# Patient Record
Sex: MALE | Race: WHITE
[De-identification: names, ages, dates, MRNs, and addresses within clinical notes are randomized per-mention and may not be internally consistent; named-entity substitution may affect disease eponyms.]

---

## 2020-08-10 ENCOUNTER — HOSPITAL ENCOUNTER (EMERGENCY)
Dept: HOSPITAL 56 - MW.ED | Age: 60
Discharge: HOME | End: 2020-08-10
Payer: COMMERCIAL

## 2020-08-10 DIAGNOSIS — E11.9: ICD-10-CM

## 2020-08-10 DIAGNOSIS — R03.0: ICD-10-CM

## 2020-08-10 DIAGNOSIS — F17.200: ICD-10-CM

## 2020-08-10 DIAGNOSIS — R07.89: Primary | ICD-10-CM

## 2020-08-10 LAB
BUN SERPL-MCNC: 14 MG/DL (ref 7–18)
CHLORIDE SERPL-SCNC: 98 MMOL/L (ref 98–107)
CO2 SERPL-SCNC: 25.2 MMOL/L (ref 21–32)
GLUCOSE SERPL-MCNC: 360 MG/DL (ref 74–106)
HBA1C BLD-MCNC: 10.1 % (ref 4.5–6.2)
POTASSIUM SERPL-SCNC: 3.9 MMOL/L (ref 3.5–5.1)
SODIUM SERPL-SCNC: 133 MMOL/L (ref 136–148)

## 2020-08-10 NOTE — CR
Chest: 2 views of the chest were obtained.

 

Comparison: No previous chest imaging.

 

Heart size and mediastinum are normal.  Lungs are clear with no acute 

parenchymal change.  No acute osseous finding is appreciated.

 

Impression:

1.  Nothing acute is seen on 2 view chest x-ray.

 

Diagnostic code #1

 

Study was dictated in MDT

## 2020-08-10 NOTE — EDM.PDOC
ED [facility] GENERAL MEDICAL PROBLEM





- General


Chief Complaint: Chest Pain


Stated Complaint: CHEST PAINS


Time Seen by Provider: 08/10/20 13:59


Source of Information: Reports: Patient


History Limitations: Reports: No Limitations





- History of Present Illness


INITIAL COMMENTS - FREE TEXT/NARRATIVE: 


60-year-old male with no past medical history presenting with chest pain.  

Patient reports intermittent chest pain, onset around 9 PM last night while at 

rest.  The chest pain migrates to different portions of the chest.  This episode

started around 9:00 this morning, located on the right side of the chest, 

nonradiating.  He describes it as "soreness".  Pain has been waxing and waning 

throughout the day but has not subsided.  Currently rated as 2 out of 10, 

patient states that it is improving on its own.  No self treatment prior to 

arrival.





Denies any diaphoresis, nausea, vomiting, history of VTE, lower extremity 

swelling or pain, hemoptysis, recent surgery or immobilization or long travel, 

history of malignancy.  No recent fever, cough, or chest wall trauma.  There is 

a family history of premature coronary artery disease.





ROS: A 10-point review of systems was negative, except as noted in the HPI (or 

in the ROS section of this note).





Past medical history: Reviewed, no additional pertinent history.


Surgical history: Reviewed in system, no additional pertinent history.


Social history: Reviewed in system, no additional pertinent history.


Family history: Reviewed in system, no additional pertinent history.


________________________________________________________________________________





PHYSICAL EXAM


Vital signs reviewed.  Nursing notes reviewed.


Constitutional: Awake, alert, non-distressed.


Head: Normocephalic, atraumatic.


Eyes: EOMI, conjunctiva normal, no discharge, no scleral icterus.


Ears, Nose, Throat: External ears and nose normal, moist oral mucosa.


Cardiovascular: 2+ radial pulses bilaterally, capillary refill less than 2 

seconds.  RRR no MRG


Pulmonary: normal work of breathing, no accessory muscle use.  CTA BL


Abdomen/GI: Soft, nontender, nondistended, no guarding or rigidity, no masses.


Musculoskeletal: No deformities.


Integumentary: Appropriate color for ethnicity, warm, dry, no pallor or 

jaundice, no rash.


Neurologic: Alert, answering questions appropriately, normal speech, no facial 

droop, moving all extremities well.


Psychiatric: Appropriate mood and affect, normal thought process.





  ** Chest


Pain Score (Numeric/FACES): 4





- Related Data


                                    Allergies











Allergy/AdvReac Type Severity Reaction Status Date / Time


 


No Known Allergies Allergy   Verified 08/10/20 14:06











Home Meds: 


                                    Home Meds





amLODIPine [Norvasc] 2.5 mg PO DAILY #30 tablet 08/10/20 [Rx]


metFORMIN [Glucophage XR] 500 mg PO BIDMEALS 30 Days #60 tab.er 08/10/20 [Rx]











Past Medical History





- Past Health History


Medical/Surgical History: Denies Medical/Surgical History





Social & Family History





- Family History


Cardiac: Reports: CAD





- Tobacco Use


Smoking Status *Q: Current Some Day Smoker





- Alcohol Use


Alcohol Use History: Yes





- Recreational Drug Use


Recreational Drug Use: No





ED ROS GENERAL





- Review of Systems


Review Of Systems: See Below





ED EXAM, GENERAL





- Physical Exam


Exam: See Below





EKG INTERPRETATION


EKG Interpretation Comments: 


12-Lead ECG Interpretation 


Acquired: 2:01 PM


Rhythm: Sinus rhythm


Rate: 77 bpm


Axis: Normal


Intervals: Normal


Ectopy: None


Ischemic Changes: None apparent


RV Strain: No obvious RV strain pattern.


ST Segments/T-Waves: Biphasic T waves in aVL


Interpretation: Unremarkable





Course





- Vital Signs


Text/Narrative:: 


Patient hemodynamically stable, afebrile, well-appearing, looks nontoxic.





Differential diagnosis includes but is not limited to: ACS, pulmonary embolism, 

aortic dissection, acute systolic heart failure, pneumonia, pneumothorax, 

pericardial effusion, pleural effusion, pericarditis, endocarditis, esophageal 

rupture, GERD, drug-induced chest pain, chest wall pain, and many others.





Twelve-lead EKG obtained, showing no STEMI or obvious ischemic pattern.  IV 

access established and labs were sent.  Given full dose aspirin.  Pain improving

 spontaneously on its own.  Chest x-rays obtained, showing no acute findings. 

Labs are reassuring except for hyperglycemia (glucose 360) and elevated 

hemoglobin A1c at 10.1.  Troponin is negative x2. Repeat ECG shows no changes or

 new ischemia. Second troponin was drawn after 6+ hours of constant pain, and is

 negative.





I considered a multitude of differential diagnoses for the patient's chest pain,

 including:





Acute coronary syndrome: the ECGs do not demonstrate acute ischemia and troponin

 testing is negative. They have a HEART score of 3. Negative troponin x2.


Under the 2015 HEART pathway study, risk of MACE at 30 days with a HEART score 

0-3 and a second negative 3-hour troponin is 0.8% (clark Calero PMID: [id number])


Pulmonary embolism: 


The patient the patient is low risk by Wells PE criteria, and:


Low clinical suspicion. There is no resting tachycardia, pleuritic pain 

component, and no clinical sign of DVT on physical examination.


Thoracic aortic dissection: equal radial pulses, no radiation of pain to the 

back, no history of connective tissue disease. The mediastinum is not widened on

 x-rays.


Acute decompensated heart failure/pulmonary edema: no significant respiratory 

distress (hypoxia, tachypnea), no significant lower extremity edema, no evidence

 of edema on chest x-rays, no JVD.


Pneumothorax/pleural effusion: no evidence of such on chest x-ray, no fever or 

sputum production, no tachypnea or hypoxia.


Pericardial effusion: no friction rub, no JVD.


Gama/pericarditis: no fever, no friction rub, negative troponin testing, non-

diagnostic ECG.


Critical aortic stenosis: no history of aortic stenosis, no significant murmur.


Endocarditis: no fever, no splinter hemorrhages noted, no murmur, no history of 

IV drug use, non-toxic appearing.


Esophageal rupture: no fever, no history of recurrent emesis, no subcutaneous 

emphysema to the neck or chest, non-toxic appearing.


Zoster/shingles: no evidence of rash to chest wall.


GERD, musculoskeletal chest pain, pleurisy, non-specific chest pain, et cetera.





The patient presented with chest pain of uncertain etiology. Based on their 

history, lab analysis, and ECG, I see no evidence at this time for a malignant 

etiology for the patient's chest pain. 





Pain essentially resolved at time of discharge. Low risk by the HEART Pathway, 

patient will discharge home with outpatient primary care follow-up for chest 

pain, new onset hypertension, and new onset diabetes mellitus.  Prescriptions 

sent for metformin and amlodipine.  Recommended over-the-counter Tylenol and 

Motrin as needed for chest pain.  Return to ER if worse.





Plan: Patient is stable to discharge home with outpatient primary care clinic 

follow-up.  Strict emergency department return precautions were provided, 

patient indicated understanding.  All questions were answered prior to 

departure.  Discharged in good condition.








HEART Pathway for Early Discharge in Acute Chest Pain


RESULT SUMMARY:


3 points


HEART Pathway Score





Low risk


0.9-1.7% 30-day MACE





Repeat troponin at 3 hours and if negative, discharge home with outpatient 

follow-up.





INPUTS:


History > 0 = Slightly suspicious


EKG > 0 = Normal


Age > 1 = 45-64


Risk factors > 2 = ?3 risk factors or history of atherosclerotic disease


Initial troponin > 0 = ?normal limit





Wells' Criteria for Pulmonary Embolism


RESULT SUMMARY:


0.0 points


Low risk group: 1.3% chance of PE in an ED population. 





Another study assigned scores ? 4 as PE Unlikely and had a 3% incidence of PE.





INPUTS:


Clinical signs and symptoms of DVT > 0 = No


PE is #1 diagnosis OR equally likely > 0 = No


Heart rate > 100 > 0 = No


Immobilization at least 3 days OR surgery in the previous 4 weeks > 0 = No


Previous, objectively diagnosed PE or DVT > 0 = No


Hemoptysis > 0 = No


Malignancy w/ treatment within 6 months or palliative > 0 = No


Last Recorded V/S: 


                                Last Vital Signs











Temp  35.9 C L  08/10/20 14:02


 


Pulse  76   08/10/20 14:02


 


Resp  20   08/10/20 14:02


 


BP  196/116 H  08/10/20 14:02


 


Pulse Ox  97   08/10/20 14:02














- Orders/Labs/Meds


Orders: 


                               Active Orders 24 hr











 Category Date Time Status


 


 Cardiac Monitoring [RC] .AS DIRECTED Care  08/10/20 14:03 Active


 


 EKG 12 Lead [EKG Documentation Completion] [RC] STAT Care  08/10/20 15:16 

Active


 


 EKG Documentation Completion [RC] STAT Care  08/10/20 14:03 Active


 


 Pulse Oximetry [RC] ASDIRECTED Care  08/10/20 14:03 Active


 


 Sodium Chloride 0.9% [Saline Flush] Med  08/10/20 14:03 Active





 10 ml FLUSH ASDIRECTED PRN   


 


 Sodium Chloride 0.9% [Saline Flush] Med  08/10/20 14:03 Active





 2.5 ml FLUSH ASDIRECTED PRN   


 


 Saline Lock Insert [OM.PC] Stat Oth  08/10/20 14:03 Ordered








                                Medication Orders





Sodium Chloride (Saline Flush)  10 ml FLUSH ASDIRECTED PRN


   PRN Reason: Keep Vein Open


Sodium Chloride (Saline Flush)  2.5 ml FLUSH ASDIRECTED PRN


   PRN Reason: Keep Vein Open








Labs: 


                                Laboratory Tests











  08/10/20 08/10/20 08/10/20 Range/Units





  14:04 14:04 14:04 


 


WBC   9.50   (4.0-11.0)  K/uL


 


RBC   5.21   (4.50-5.90)  M/uL


 


Hgb   16.1   (13.0-17.0)  g/dL


 


Hct   44.0   (38.0-50.0)  %


 


MCV   84.5   (80.0-98.0)  fL


 


MCH   30.9   (27.0-32.0)  pg


 


MCHC   36.6   (31.0-37.0)  g/dL


 


RDW Std Deviation   38.8   (28.0-62.0)  fl


 


RDW Coeff of Lexi   13   (11.0-15.0)  %


 


Plt Count   207   (150-400)  K/uL


 


MPV   10.90   (7.40-12.00)  fL


 


Neut % (Auto)   51.0   (48.0-80.0)  %


 


Lymph % (Auto)   40.8 H   (16.0-40.0)  %


 


Mono % (Auto)   6.2   (0.0-15.0)  %


 


Eos % (Auto)   1.6   (0.0-7.0)  %


 


Baso % (Auto)   0.4   (0.0-1.5)  %


 


Neut # (Auto)   4.8   (1.4-5.7)  K/uL


 


Lymph # (Auto)   3.9 H   (0.6-2.4)  K/uL


 


Mono # (Auto)   0.6   (0.0-0.8)  K/uL


 


Eos # (Auto)   0.2   (0.0-0.7)  K/uL


 


Baso # (Auto)   0.0   (0.0-0.1)  K/uL


 


Sodium  133 L    (136-148)  mmol/L


 


Potassium  3.9    (3.5-5.1)  mmol/L


 


Chloride  98    ()  mmol/L


 


Carbon Dioxide  25.2    (21.0-32.0)  mmol/L


 


BUN  14    (7.0-18.0)  mg/dL


 


Creatinine  1.1    (0.8-1.3)  mg/dL


 


Est Cr Clr Drug Dosing  80.34    mL/min


 


Estimated GFR (MDRD)  > 60.0    ml/min


 


Glucose  360 H    ()  mg/dL


 


Hemoglobin A1c    10.1 H  (4.5-6.2)  %


 


Calcium  8.5    (8.5-10.1)  mg/dL


 


Total Bilirubin  0.6    (0.2-1.0)  mg/dL


 


AST  91 H    (15-37)  IU/L


 


ALT  65 H    (14-63)  IU/L


 


Alkaline Phosphatase  117 H    ()  U/L


 


Troponin I  < 0.050    (0.000-0.056)  ng/mL


 


Total Protein  7.5    (6.4-8.2)  g/dL


 


Albumin  4.0    (3.4-5.0)  g/dL


 


Globulin  3.5    (2.6-4.0)  g/dL


 


Albumin/Globulin Ratio  1.1    (0.9-1.6)  














  08/10/20 Range/Units





  15:36 


 


WBC   (4.0-11.0)  K/uL


 


RBC   (4.50-5.90)  M/uL


 


Hgb   (13.0-17.0)  g/dL


 


Hct   (38.0-50.0)  %


 


MCV   (80.0-98.0)  fL


 


MCH   (27.0-32.0)  pg


 


MCHC   (31.0-37.0)  g/dL


 


RDW Std Deviation   (28.0-62.0)  fl


 


RDW Coeff of Lexi   (11.0-15.0)  %


 


Plt Count   (150-400)  K/uL


 


MPV   (7.40-12.00)  fL


 


Neut % (Auto)   (48.0-80.0)  %


 


Lymph % (Auto)   (16.0-40.0)  %


 


Mono % (Auto)   (0.0-15.0)  %


 


Eos % (Auto)   (0.0-7.0)  %


 


Baso % (Auto)   (0.0-1.5)  %


 


Neut # (Auto)   (1.4-5.7)  K/uL


 


Lymph # (Auto)   (0.6-2.4)  K/uL


 


Mono # (Auto)   (0.0-0.8)  K/uL


 


Eos # (Auto)   (0.0-0.7)  K/uL


 


Baso # (Auto)   (0.0-0.1)  K/uL


 


Sodium   (136-148)  mmol/L


 


Potassium   (3.5-5.1)  mmol/L


 


Chloride   ()  mmol/L


 


Carbon Dioxide   (21.0-32.0)  mmol/L


 


BUN   (7.0-18.0)  mg/dL


 


Creatinine   (0.8-1.3)  mg/dL


 


Est Cr Clr Drug Dosing   mL/min


 


Estimated GFR (MDRD)   ml/min


 


Glucose   ()  mg/dL


 


Hemoglobin A1c   (4.5-6.2)  %


 


Calcium   (8.5-10.1)  mg/dL


 


Total Bilirubin   (0.2-1.0)  mg/dL


 


AST   (15-37)  IU/L


 


ALT   (14-63)  IU/L


 


Alkaline Phosphatase   ()  U/L


 


Troponin I  < 0.050  (0.000-0.056)  ng/mL


 


Total Protein   (6.4-8.2)  g/dL


 


Albumin   (3.4-5.0)  g/dL


 


Globulin   (2.6-4.0)  g/dL


 


Albumin/Globulin Ratio   (0.9-1.6)  











Meds: 


Medications











Generic Name Dose Route Start Last Admin





  Trade Name Freq  PRN Reason Stop Dose Admin


 


Sodium Chloride  10 ml  08/10/20 14:03 





  Saline Flush  FLUSH  





  ASDIRECTED PRN  





  Keep Vein Open  


 


Sodium Chloride  2.5 ml  08/10/20 14:03 





  Saline Flush  FLUSH  





  ASDIRECTED PRN  





  Keep Vein Open  














Departure





- Departure


Time of Disposition: 16:15


Disposition: Home, Self-Care 01


Condition: Good


Clinical Impression: 


 Atypical chest pain, Elevated blood pressure reading





Diabetes mellitus


Qualifiers:


 Diabetes mellitus type: type 2 Diabetes mellitus long term insulin use: without

 long term use Diabetes mellitus complication status: without complication 

Qualified Code(s): E11.9 - Type 2 diabetes mellitus without complications





Prescriptions: 


metFORMIN [Glucophage XR] 500 mg PO BIDMEALS 30 Days #60 tab.er


amLODIPine [Norvasc] 2.5 mg PO DAILY #30 tablet


Instructions:  Type 2 Diabetes Mellitus, Diagnosis, Adult, Living With Diabetes,

 Nonspecific Chest Pain, Adult, Preventing Diabetes Mellitus Complications, 

Diabetes Mellitus and Exercise


Referrals: 


[facility] [Provider Group] - 1 Week (For follow-up care of high 

blood pressure and diabetes mellitus.)


Forms:  ED Department Discharge


Additional Instructions: 


You were seen in the emergency department for chest pain.  You are found to have

high blood pressure and diabetes.  It is very important you establish care with 

a primary care clinic, I would like for you to have an initial point within the 

next 2 to 3 weeks.  We did prescribe some blood pressure medication and diabetes

medication to the pharmacy.  Please take these as directed.





Please return the emergency department immediately if your symptoms worsen or if

you feel worse.





**************************************************





Thank you for choosing the [facility] emergency department in 

[place] for your medical needs today.  It was a pleasure caring for you.





The following information is given to patients seen in the emergency department 

who are being discharged. This information is to outline your options for 

follow-up care. We provide all patients seen in our emergency department with a 

follow-up referral.





The need for follow-up, as well as the timing and circumstances, are variable 

depending upon the specifics of your emergency department visit.





If you don't have a primary care physician on staff, we will provide you with a 

referral. We always advise you to contact your personal physician following an 

emergency department visit to inform them of the circumstance of the visit and 

for follow-up with them and/or the need for any referrals to a consulting 

specialist.





The emergency department will also refer you to a specialist when appropriate. 

This referral assures that you have the opportunity for follow-up care with a 

specialist. All of these measure are taken in an effort to provide you with 

optimal care, which includes your follow-up.





Under all circumstances we always encourage you to contact your private 

physician who remains a resource for coordinating your care. When calling for 

follow-up care, please make the office aware that this follow-up is from your 

recent emergency room visit. If for any reason you are refused follow-up, please

contact the [facility] Emergency Department

at [phone] and asked to speak to the emergency department charge nurse.





If you do not have a primary care physician that is caring for you, you can con

tact these clinics below to set up an appointment to establish care:





[facility] - Primary Care


[address] [place] [address]


Phone: [phone]


Fax: [phone]





[facility]


[address] [place] [address]


Phone: [phone]


Fax: [phone]





Sepsis Event Note (ED)





- Focused Exam


Vital Signs: 


                                   Vital Signs











  Temp Pulse Resp BP Pulse Ox


 


 08/10/20 14:02  35.9 C L  76  20  196/116 H  97














- My Orders


Last 24 Hours: 


My Active Orders





08/10/20 14:03


Cardiac Monitoring [RC] .AS DIRECTED 


EKG Documentation Completion [RC] STAT 


Pulse Oximetry [RC] ASDIRECTED 


Sodium Chloride 0.9% [Saline Flush]   10 ml FLUSH ASDIRECTED PRN 


Sodium Chloride 0.9% [Saline Flush]   2.5 ml FLUSH ASDIRECTED PRN 


Saline Lock Insert [OM.PC] Stat 





08/10/20 15:16


EKG 12 Lead [EKG Documentation Completion] [RC] STAT 














- Assessment/Plan


Last 24 Hours: 


My Active Orders





08/10/20 14:03


Cardiac Monitoring [RC] .AS DIRECTED 


EKG Documentation Completion [RC] STAT 


Pulse Oximetry [RC] ASDIRECTED 


Sodium Chloride 0.9% [Saline Flush]   10 ml FLUSH ASDIRECTED PRN 


Sodium Chloride 0.9% [Saline Flush]   2.5 ml FLUSH ASDIRECTED PRN 


Saline Lock Insert [OM.PC] Stat 





08/10/20 15:16


EKG 12 Lead [EKG Documentation Completion] [RC] STAT